# Patient Record
(demographics unavailable — no encounter records)

---

## 2025-05-06 NOTE — PHYSICAL EXAM
[] : septum deviated to the right [de-identified] : MILD MUCOSAL IRRITATION [Normal] : mucosa is normal [Midline] : trachea located in midline position

## 2025-05-06 NOTE — HISTORY OF PRESENT ILLNESS
[de-identified] : NASAL OBSTRUCTION X MANY YEARS SNORING MEDICAL HX REVIEWED HX OF NASAL TRAUMA MANY YEARS AGO

## 2025-05-06 NOTE — REVIEW OF SYSTEMS
[Dizziness] : dizziness [Vertigo] : vertigo [Lightheadedness] : lightheadedness [Nasal Congestion] : nasal congestion [Nose Bleeds] : nose bleeds [Recurrent Sinus Infections] : recurrent sinus infections [Problem Snoring] : problem snoring [Sinus Pain] : sinus pain [Sinus Pressure] : sinus pressure [Snoring With Pauses] : snoring with pauses [Eye Pain] : eye pain [Heartburn] : heartburn [Anxiety] : anxiety [Negative] : Heme/Lymph [Patient Intake Form Reviewed] : Patient intake form was reviewed [FreeTextEntry7] : reflux [de-identified] : headache  [FreeTextEntry1] : fatigue, daytime sleepiness